# Patient Record
Sex: FEMALE | URBAN - METROPOLITAN AREA
[De-identification: names, ages, dates, MRNs, and addresses within clinical notes are randomized per-mention and may not be internally consistent; named-entity substitution may affect disease eponyms.]

---

## 2019-01-27 ENCOUNTER — NURSE TRIAGE (OUTPATIENT)
Dept: NURSING | Facility: CLINIC | Age: 29
End: 2019-01-27

## 2023-09-28 NOTE — TELEPHONE ENCOUNTER
"Patient states has had urinary frequency x 2 days.   Since yesterday has also had mid low quadrant constant \"pressure and slight cramping.\"  States noticed small amount blood on tissue x 1 today.  No pain on voiding.   Afebrile by report. Temp. Not checked.  Tolerating fluids well. Last voided 30 minutes ago. Light yellow. No odor.  Denies possible pregnancy.    Protocol-    Urinary Symptoms- Adult   Care advice reviewed.   Disposition-  See Physician within 24 hours  Caller states understanding of the recommended disposition.   Advised to be seen at an Urgent Care within 24 hours.  This RN gave caller the [facility] resource information.  Caller states will check her health plan through [facility]. Caller declined FNA assistance.  Advised to call back if further questions or concerns.     QUYNH Lee RN  [facility]     Reason for Disposition    Urinating more frequently than usual (i.e., frequency)    Additional Information    Negative: Shock suspected (e.g., cold/pale/clammy skin, too weak to stand, low BP, rapid pulse)    Negative: Sounds like a life-threatening emergency to the triager    Negative: [1] Unable to urinate (or only a few drops) > 4 hours AND     [2] bladder feels very full (e.g., palpable bladder or strong urge to urinate)    Negative: [1] Decreased urination and [2] drinking very little AND [2] dehydration suspected (e.g., dark urine, no urine > 12 hours, very dry mouth, very lightheaded)    Negative: Patient sounds very sick or weak to the triager    Negative: Fever > 100.5 F (38.1 C)    Negative: Side (flank) or lower back pain present    Negative: [1] Can't control passage of urine (i.e., urinary incontinence) AND [2] new onset (< 2 weeks) or worsening    Protocols used: URINARY SYMPTOMS-ADULT-AH      "
Initial (On Arrival)